# Patient Record
Sex: FEMALE | Race: OTHER | NOT HISPANIC OR LATINO
[De-identification: names, ages, dates, MRNs, and addresses within clinical notes are randomized per-mention and may not be internally consistent; named-entity substitution may affect disease eponyms.]

---

## 2024-05-06 PROBLEM — Z00.00 ENCOUNTER FOR PREVENTIVE HEALTH EXAMINATION: Status: ACTIVE | Noted: 2024-05-06

## 2024-05-07 ENCOUNTER — APPOINTMENT (OUTPATIENT)
Dept: MATERNAL FETAL MEDICINE | Facility: CLINIC | Age: 34
End: 2024-05-07
Payer: MEDICAID

## 2024-05-07 ENCOUNTER — APPOINTMENT (OUTPATIENT)
Dept: ANTEPARTUM | Facility: CLINIC | Age: 34
End: 2024-05-07
Payer: MEDICAID

## 2024-05-07 ENCOUNTER — ASOB RESULT (OUTPATIENT)
Age: 34
End: 2024-05-07

## 2024-05-07 DIAGNOSIS — O24.414 GESTATIONAL DIABETES MELLITUS IN PREGNANCY, INSULIN CONTROLLED: ICD-10-CM

## 2024-05-07 PROCEDURE — 76805 OB US >/= 14 WKS SNGL FETUS: CPT

## 2024-05-07 PROCEDURE — 76819 FETAL BIOPHYS PROFIL W/O NST: CPT

## 2024-05-07 PROCEDURE — G0108 DIAB MANAGE TRN  PER INDIV: CPT | Mod: 95

## 2024-05-07 RX ORDER — ISOPROPYL ALCOHOL 0.7 ML/ML
SWAB TOPICAL
Qty: 2 | Refills: 0 | Status: ACTIVE | COMMUNITY
Start: 2024-05-07 | End: 1900-01-01

## 2024-05-07 RX ORDER — INSULIN LISPRO 100 [IU]/ML
100 INJECTION, SOLUTION INTRAVENOUS; SUBCUTANEOUS
Qty: 2 | Refills: 0 | Status: ACTIVE | COMMUNITY
Start: 2024-05-07 | End: 1900-01-01

## 2024-05-07 RX ORDER — INSULIN GLARGINE 100 [IU]/ML
100 INJECTION, SOLUTION SUBCUTANEOUS
Qty: 1 | Refills: 1 | Status: ACTIVE | COMMUNITY
Start: 2024-05-07 | End: 1900-01-01

## 2024-05-07 RX ORDER — ELECTROLYTES/DEXTROSE
32G X 4 MM SOLUTION, ORAL ORAL
Qty: 2 | Refills: 2 | Status: ACTIVE | COMMUNITY
Start: 2024-05-07 | End: 1900-01-01

## 2024-05-14 ENCOUNTER — ASOB RESULT (OUTPATIENT)
Age: 34
End: 2024-05-14

## 2024-05-14 ENCOUNTER — APPOINTMENT (OUTPATIENT)
Dept: ANTEPARTUM | Facility: CLINIC | Age: 34
End: 2024-05-14
Payer: MEDICAID

## 2024-05-14 ENCOUNTER — APPOINTMENT (OUTPATIENT)
Dept: MATERNAL FETAL MEDICINE | Facility: CLINIC | Age: 34
End: 2024-05-14
Payer: MEDICAID

## 2024-05-14 PROCEDURE — 76818 FETAL BIOPHYS PROFILE W/NST: CPT

## 2024-05-14 PROCEDURE — G0108 DIAB MANAGE TRN  PER INDIV: CPT

## 2024-05-14 PROCEDURE — ZZZZZ: CPT

## 2024-05-19 ENCOUNTER — TRANSCRIPTION ENCOUNTER (OUTPATIENT)
Age: 34
End: 2024-05-19

## 2024-05-20 ENCOUNTER — INPATIENT (INPATIENT)
Facility: HOSPITAL | Age: 34
LOS: 2 days | Discharge: HOME | DRG: 951 | End: 2024-05-23
Attending: OBSTETRICS & GYNECOLOGY | Admitting: OBSTETRICS & GYNECOLOGY
Payer: MEDICAID

## 2024-05-20 VITALS
OXYGEN SATURATION: 99 % | HEART RATE: 88 BPM | RESPIRATION RATE: 16 BRPM | TEMPERATURE: 98 F | DIASTOLIC BLOOD PRESSURE: 58 MMHG | SYSTOLIC BLOOD PRESSURE: 112 MMHG

## 2024-05-20 DIAGNOSIS — Z98.891 HISTORY OF UTERINE SCAR FROM PREVIOUS SURGERY: Chronic | ICD-10-CM

## 2024-05-20 DIAGNOSIS — O24.419 GESTATIONAL DIABETES MELLITUS IN PREGNANCY, UNSPECIFIED CONTROL: ICD-10-CM

## 2024-05-20 DIAGNOSIS — O47.9 FALSE LABOR, UNSPECIFIED: ICD-10-CM

## 2024-05-20 DIAGNOSIS — Z34.80 ENCOUNTER FOR SUPERVISION OF OTHER NORMAL PREGNANCY, UNSPECIFIED TRIMESTER: ICD-10-CM

## 2024-05-20 DIAGNOSIS — O34.219 MATERNAL CARE FOR UNSPECIFIED TYPE SCAR FROM PREVIOUS CESAREAN DELIVERY: ICD-10-CM

## 2024-05-20 DIAGNOSIS — O26.899 OTHER SPECIFIED PREGNANCY RELATED CONDITIONS, UNSPECIFIED TRIMESTER: ICD-10-CM

## 2024-05-20 LAB
ABO RH CONFIRMATION: SIGNIFICANT CHANGE UP
AMNISURE ROM (RUPTURE OF MEMBRANES): NEGATIVE — SIGNIFICANT CHANGE UP
APTT BLD: 27.9 SEC — SIGNIFICANT CHANGE UP (ref 24.5–35.6)
HCT VFR BLD CALC: 33.6 % — LOW (ref 34.5–45)
HGB BLD-MCNC: 10.5 G/DL — LOW (ref 11.5–15.5)
INR BLD: 1 RATIO — SIGNIFICANT CHANGE UP (ref 0.85–1.18)
MCHC RBC-ENTMCNC: 22 PG — LOW (ref 27–34)
MCHC RBC-ENTMCNC: 31.3 GM/DL — LOW (ref 32–36)
MCV RBC AUTO: 70.4 FL — LOW (ref 80–100)
NRBC # BLD: 0 /100 WBCS — SIGNIFICANT CHANGE UP (ref 0–0)
PLATELET # BLD AUTO: 259 K/UL — SIGNIFICANT CHANGE UP (ref 150–400)
PROTHROM AB SERPL-ACNC: 11.4 SEC — SIGNIFICANT CHANGE UP (ref 9.5–13)
RBC # BLD: 4.77 M/UL — SIGNIFICANT CHANGE UP (ref 3.8–5.2)
RBC # FLD: 14.5 % — SIGNIFICANT CHANGE UP (ref 10.3–14.5)
WBC # BLD: 10.46 K/UL — SIGNIFICANT CHANGE UP (ref 3.8–10.5)
WBC # FLD AUTO: 10.46 K/UL — SIGNIFICANT CHANGE UP (ref 3.8–10.5)

## 2024-05-20 PROCEDURE — 88305 TISSUE EXAM BY PATHOLOGIST: CPT | Mod: 26

## 2024-05-20 DEVICE — AVITENE FLOUR SIX 0.5GR: Type: IMPLANTABLE DEVICE | Status: FUNCTIONAL

## 2024-05-20 DEVICE — SURGICEL FIBRILLAR 2 X 4": Type: IMPLANTABLE DEVICE | Status: FUNCTIONAL

## 2024-05-20 RX ORDER — MORPHINE SULFATE 50 MG/1
0.3 CAPSULE, EXTENDED RELEASE ORAL ONCE
Refills: 0 | Status: DISCONTINUED | OUTPATIENT
Start: 2024-05-20 | End: 2024-05-20

## 2024-05-20 RX ORDER — DEXTROSE 10 % IN WATER 10 %
125 INTRAVENOUS SOLUTION INTRAVENOUS ONCE
Refills: 0 | Status: DISCONTINUED | OUTPATIENT
Start: 2024-05-20 | End: 2024-05-23

## 2024-05-20 RX ORDER — CEFAZOLIN SODIUM 1 G
2000 VIAL (EA) INJECTION ONCE
Refills: 0 | Status: COMPLETED | OUTPATIENT
Start: 2024-05-20 | End: 2024-05-20

## 2024-05-20 RX ORDER — DIPHENHYDRAMINE HCL 50 MG
25 CAPSULE ORAL EVERY 6 HOURS
Refills: 0 | Status: DISCONTINUED | OUTPATIENT
Start: 2024-05-20 | End: 2024-05-23

## 2024-05-20 RX ORDER — SODIUM CHLORIDE 9 MG/ML
1000 INJECTION, SOLUTION INTRAVENOUS
Refills: 0 | Status: DISCONTINUED | OUTPATIENT
Start: 2024-05-20 | End: 2024-05-20

## 2024-05-20 RX ORDER — MAGNESIUM HYDROXIDE 400 MG/1
30 TABLET, CHEWABLE ORAL
Refills: 0 | Status: DISCONTINUED | OUTPATIENT
Start: 2024-05-20 | End: 2024-05-23

## 2024-05-20 RX ORDER — OXYTOCIN 10 UNIT/ML
333.33 VIAL (ML) INJECTION
Qty: 20 | Refills: 0 | Status: COMPLETED | OUTPATIENT
Start: 2024-05-20 | End: 2024-05-20

## 2024-05-20 RX ORDER — FAMOTIDINE 10 MG/ML
20 INJECTION INTRAVENOUS ONCE
Refills: 0 | Status: COMPLETED | OUTPATIENT
Start: 2024-05-20 | End: 2024-05-20

## 2024-05-20 RX ORDER — OXYCODONE HYDROCHLORIDE 5 MG/1
5 TABLET ORAL
Refills: 0 | Status: DISCONTINUED | OUTPATIENT
Start: 2024-05-20 | End: 2024-05-23

## 2024-05-20 RX ORDER — SIMETHICONE 80 MG/1
80 TABLET, CHEWABLE ORAL EVERY 4 HOURS
Refills: 0 | Status: DISCONTINUED | OUTPATIENT
Start: 2024-05-20 | End: 2024-05-23

## 2024-05-20 RX ORDER — TETANUS TOXOID, REDUCED DIPHTHERIA TOXOID AND ACELLULAR PERTUSSIS VACCINE, ADSORBED 5; 2.5; 8; 8; 2.5 [IU]/.5ML; [IU]/.5ML; UG/.5ML; UG/.5ML; UG/.5ML
0.5 SUSPENSION INTRAMUSCULAR ONCE
Refills: 0 | Status: DISCONTINUED | OUTPATIENT
Start: 2024-05-20 | End: 2024-05-23

## 2024-05-20 RX ORDER — LANOLIN
1 OINTMENT (GRAM) TOPICAL EVERY 6 HOURS
Refills: 0 | Status: DISCONTINUED | OUTPATIENT
Start: 2024-05-20 | End: 2024-05-23

## 2024-05-20 RX ORDER — KETOROLAC TROMETHAMINE 30 MG/ML
30 SYRINGE (ML) INJECTION EVERY 6 HOURS
Refills: 0 | Status: DISCONTINUED | OUTPATIENT
Start: 2024-05-20 | End: 2024-05-21

## 2024-05-20 RX ORDER — ACETAMINOPHEN 500 MG
975 TABLET ORAL
Refills: 0 | Status: DISCONTINUED | OUTPATIENT
Start: 2024-05-20 | End: 2024-05-23

## 2024-05-20 RX ORDER — CHLORHEXIDINE GLUCONATE 213 G/1000ML
1 SOLUTION TOPICAL DAILY
Refills: 0 | Status: DISCONTINUED | OUTPATIENT
Start: 2024-05-20 | End: 2024-05-20

## 2024-05-20 RX ORDER — HEPARIN SODIUM 5000 [USP'U]/ML
5000 INJECTION INTRAVENOUS; SUBCUTANEOUS EVERY 12 HOURS
Refills: 0 | Status: DISCONTINUED | OUTPATIENT
Start: 2024-05-21 | End: 2024-05-23

## 2024-05-20 RX ORDER — SODIUM CHLORIDE 9 MG/ML
1000 INJECTION, SOLUTION INTRAVENOUS
Refills: 0 | Status: DISCONTINUED | OUTPATIENT
Start: 2024-05-20 | End: 2024-05-23

## 2024-05-20 RX ORDER — CITRIC ACID/SODIUM CITRATE 300-500 MG
30 SOLUTION, ORAL ORAL ONCE
Refills: 0 | Status: COMPLETED | OUTPATIENT
Start: 2024-05-20 | End: 2024-05-20

## 2024-05-20 RX ORDER — GLUCAGON INJECTION, SOLUTION 0.5 MG/.1ML
1 INJECTION, SOLUTION SUBCUTANEOUS ONCE
Refills: 0 | Status: DISCONTINUED | OUTPATIENT
Start: 2024-05-20 | End: 2024-05-23

## 2024-05-20 RX ORDER — DEXTROSE 50 % IN WATER 50 %
12.5 SYRINGE (ML) INTRAVENOUS ONCE
Refills: 0 | Status: DISCONTINUED | OUTPATIENT
Start: 2024-05-20 | End: 2024-05-23

## 2024-05-20 RX ORDER — OXYCODONE HYDROCHLORIDE 5 MG/1
5 TABLET ORAL ONCE
Refills: 0 | Status: DISCONTINUED | OUTPATIENT
Start: 2024-05-20 | End: 2024-05-23

## 2024-05-20 RX ORDER — DEXTROSE 50 % IN WATER 50 %
25 SYRINGE (ML) INTRAVENOUS ONCE
Refills: 0 | Status: DISCONTINUED | OUTPATIENT
Start: 2024-05-20 | End: 2024-05-23

## 2024-05-20 RX ORDER — IBUPROFEN 200 MG
600 TABLET ORAL EVERY 6 HOURS
Refills: 0 | Status: COMPLETED | OUTPATIENT
Start: 2024-05-20 | End: 2025-04-18

## 2024-05-20 RX ORDER — INSULIN LISPRO 100/ML
VIAL (ML) SUBCUTANEOUS
Refills: 0 | Status: DISCONTINUED | OUTPATIENT
Start: 2024-05-20 | End: 2024-05-22

## 2024-05-20 RX ORDER — SODIUM CHLORIDE 9 MG/ML
1000 INJECTION, SOLUTION INTRAVENOUS
Refills: 0 | Status: DISCONTINUED | OUTPATIENT
Start: 2024-05-20 | End: 2024-05-22

## 2024-05-20 RX ORDER — DEXTROSE 50 % IN WATER 50 %
15 SYRINGE (ML) INTRAVENOUS ONCE
Refills: 0 | Status: DISCONTINUED | OUTPATIENT
Start: 2024-05-20 | End: 2024-05-23

## 2024-05-20 RX ADMIN — FAMOTIDINE 20 MILLIGRAM(S): 10 INJECTION INTRAVENOUS at 11:09

## 2024-05-20 RX ADMIN — Medication 30 MILLIGRAM(S): at 23:43

## 2024-05-20 RX ADMIN — Medication 975 MILLIGRAM(S): at 21:45

## 2024-05-20 RX ADMIN — Medication 30 MILLIGRAM(S): at 18:00

## 2024-05-20 RX ADMIN — Medication 100 MILLIGRAM(S): at 12:30

## 2024-05-20 RX ADMIN — Medication 30 MILLIGRAM(S): at 19:00

## 2024-05-20 RX ADMIN — Medication 975 MILLIGRAM(S): at 20:45

## 2024-05-20 RX ADMIN — Medication 1000 MILLIUNIT(S)/MIN: at 17:16

## 2024-05-20 RX ADMIN — SODIUM CHLORIDE 125 MILLILITER(S): 9 INJECTION, SOLUTION INTRAVENOUS at 11:37

## 2024-05-20 RX ADMIN — Medication 30 MILLILITER(S): at 11:09

## 2024-05-20 NOTE — OB RN TRIAGE NOTE - FALL HARM RISK - UNIVERSAL INTERVENTIONS
Bed in lowest position, wheels locked, appropriate side rails in place/Call bell, personal items and telephone in reach/Instruct patient to call for assistance before getting out of bed or chair/Non-slip footwear when patient is out of bed/[place] to call system/Physically safe environment - no spills, clutter or unnecessary equipment/Purposeful Proactive Rounding/Room/bathroom lighting operational, light cord in reach

## 2024-05-20 NOTE — OB RN PATIENT PROFILE - NSSDOHPUBLICBEN_OBGYN_A_OB
Called pt to come in to appt today since pft machine is up and running. No answer. LVM.    I do not need help with these

## 2024-05-20 NOTE — OB NEONATOLOGY/PEDIATRICIAN DELIVERY SUMMARY - NSPEDSNEONOTESA_OBGYN_ALL_OB_FT
attended c/s delivery. indication repeat c/s in labor all prenatal labs wnl. GBS neg. blood gp. A positive.. Hepatitis C is unknown. amniotic fluid clear . baby girl  born by cephalic presentation cried soon after birth. cord clamped at 30 secs.  3 umbellical vessels present. physical exam normal. no anomalies.hemodynamically stable.  apgars 9/9. plan [place] care, breast feeding attended c/s delivery. indication repeat c/s in labor all prenatal labs wnl. GBS neg. blood gp. A positive.. Hepatitis C is unknown. amniotic fluid clear .gestational diabetic on Insulin. baby girl  born by cephalic presentation cried soon after birth. cord clamped at 30 secs.  3 umbellical vessels present. physical exam normal. no anomalies.hemodynamically stable.  apgars 9/9. plan [place] care, breast feeding

## 2024-05-20 NOTE — OB RN TRIAGE NOTE - NS_DATEOFLASTVISIT_OBGYN_ALL_OB_DT
There are no preventive care reminders to display for this patient.  Chart review done. HM updated. Immunizations reviewed & updated. Care Everywhere updated.  Confirmed that Mammogram from 11/29/22 transferred to chart via Care Everywhere.    
16-May-2024

## 2024-05-20 NOTE — OB PROVIDER H&P - PROBLEM SELECTOR PLAN 2
34 yo  at 37w5d with h/o prior c/s, GDMA2 ruled out for ROM, now with uterine CTX for repeat c/s, NST reactive, AFVSS  - admit to L&D  - fetal monitoring  - NPO/IVF  - CBC  - RPR  - coags/fibrinogen  - anncef/bicitra pre op  - SCDs/keenan in OR  - consent for repeat c/s and BTL to be obtained by Dr. Marie

## 2024-05-20 NOTE — OB RN PATIENT PROFILE - NS_OBGYNHISTORY_OBGYN_ALL_OB_FT
POBHx:  2020 FT c/s 9# for LGA, never labored, uncomplicated    PGYNHx:  no abnl PAP, no C/F, no STI    GDAM2  15 u at night Levinor  4u before meals Humalog

## 2024-05-20 NOTE — OB PROVIDER DELIVERY SUMMARY - NS_ROMTYPE_OBGYN_ALL_OB
Can order Vit D, 25, CBCD, CMP, TSH, Free T4, Iron/IBC based on if parent wants to come into the office or go to an ACL lab.    AROM

## 2024-05-20 NOTE — OB PROVIDER H&P - PROBLEM SELECTOR PLAN 1
32 yo  at 37w5d with h/o prior c/s, GDMA2 ruled out for ROM, now with uterine CTX for repeat c/s, NST reactive, AFVSS  - type and cross u

## 2024-05-20 NOTE — OB RN PATIENT PROFILE - FALL HARM RISK - UNIVERSAL INTERVENTIONS
Bed in lowest position, wheels locked, appropriate side rails in place/Call bell, personal items and telephone in reach/Instruct patient to call for assistance before getting out of bed or chair/Non-slip footwear when patient is out of bed/[place] to call system/Physically safe environment - no spills, clutter or unnecessary equipment/Purposeful Proactive Rounding/Room/bathroom lighting operational, light cord in reach

## 2024-05-20 NOTE — OB PROVIDER H&P - HISTORY OF PRESENT ILLNESS
32 yo  at 37w5d EDC 24, LMP 23 h/o prior c/s presents with irregular CTX and "pop" at 540a with gush of yellow fluid. no further LOF. No VB. +FM.    PNC With Dr. Marie c/b GDMA2 on humalog 4u AC/HS and levemir 15 u qhs. Fasting 80-90s, PP 90-140s    POBHx:  2020 FT c/s 9# for LGA, never labored, uncomplicatf    PGYNHx:  no abnl PAP, no C/F, no STI    PMHx:  denies    PSHx:  c/s     Meds: levemir, humalog, PNV    All: shellfish-> swelling    SHx: no T/E/D    FHx: denies    ROS: asa dexter

## 2024-05-20 NOTE — OB PROVIDER H&P - ASSESSMENT
34 yo  at 37w5d with h/o prior c/s, GDMA2 ruled out for ROM, now with uterine CTX for repeat c/s, NST reactive, AFVSS  - admit to L&D  - fetal monitoring  - NPO/IVF  - CBC/type and cross u  - RPR  - coags/fibrinogen  - FSG 99  - anncef/bicitra pre op  - SCDs/keenan in OR  - consent for repeat c/s and BTL to be obtained by Dr. Marie

## 2024-05-20 NOTE — OB PROVIDER H&P - NSHPPHYSICALEXAM_GEN_ALL_CORE
Vital Signs Last 24 Hrs  T(C): 36.6 (20 May 2024 09:01), Max: 36.6 (20 May 2024 09:01)  T(F): 97.9 (20 May 2024 09:01), Max: 97.9 (20 May 2024 09:01)  HR: 88 (20 May 2024 09:01) (88 - 88)  BP: 112/58 (20 May 2024 09:01) (112/58 - 112/58)  BP(mean): --  RR: 16 (20 May 2024 09:01) (16 - 16)  SpO2: 99% (20 May 2024 09:01) (99% - 99%)    Parameters below as of 20 May 2024 09:01  Patient On (Oxygen Delivery Method): room air    Gen: NAD  Abd: soft, NT, gravid  Ext: nontender bilat    VE: 0/0/-5  Amnisure neg    EFW: 4000g    BSUS: vertex, anterior placenta

## 2024-05-20 NOTE — OB PROVIDER DELIVERY SUMMARY - NSSELHIDDEN_OBGYN_ALL_OB_FT
[NS_DeliveryAttending1_OBGYN_ALL_OB_FT:[id number]==],[NS_DeliveryAttending2_OBGYN_ALL_OB_FT:[id number]==]

## 2024-05-20 NOTE — OB RN INTRAOPERATIVE NOTE - NSSELHIDDEN_OBGYN_ALL_OB_FT
[NS_DeliveryAttending1_OBGYN_ALL_OB_FT:[id number]==],[NS_DeliveryAttending2_OBGYN_ALL_OB_FT:[id number]==]

## 2024-05-20 NOTE — OB RN DELIVERY SUMMARY - NSSELHIDDEN_OBGYN_ALL_OB_FT
[NS_DeliveryAttending1_OBGYN_ALL_OB_FT:[id number]==],[NS_DeliveryAttending2_OBGYN_ALL_OB_FT:[id number]==] [NS_DeliveryAttending1_OBGYN_ALL_OB_FT:[id number]==],[NS_DeliveryAttending2_OBGYN_ALL_OB_FT:[id number]==],[NS_DeliveryAssist1_OBGYN_ALL_OB_FT:[id number]=]

## 2024-05-21 ENCOUNTER — TRANSCRIPTION ENCOUNTER (OUTPATIENT)
Age: 34
End: 2024-05-21

## 2024-05-21 LAB
A1C WITH ESTIMATED AVERAGE GLUCOSE RESULT: 4.6 % — SIGNIFICANT CHANGE UP (ref 4–5.6)
BASOPHILS # BLD AUTO: 0.02 K/UL — SIGNIFICANT CHANGE UP (ref 0–0.2)
BASOPHILS NFR BLD AUTO: 0.2 % — SIGNIFICANT CHANGE UP (ref 0–2)
EOSINOPHIL # BLD AUTO: 0.06 K/UL — SIGNIFICANT CHANGE UP (ref 0–0.5)
EOSINOPHIL NFR BLD AUTO: 0.7 % — SIGNIFICANT CHANGE UP (ref 0–6)
ESTIMATED AVERAGE GLUCOSE: 85 MG/DL — SIGNIFICANT CHANGE UP (ref 68–114)
HCT VFR BLD CALC: 26.3 % — LOW (ref 34.5–45)
HCV AB S/CO SERPL IA: 0.08 S/CO — SIGNIFICANT CHANGE UP (ref 0–0.99)
HCV AB SERPL-IMP: SIGNIFICANT CHANGE UP
HGB BLD-MCNC: 8.4 G/DL — LOW (ref 11.5–15.5)
IMM GRANULOCYTES NFR BLD AUTO: 0.3 % — SIGNIFICANT CHANGE UP (ref 0–0.9)
LYMPHOCYTES # BLD AUTO: 2.03 K/UL — SIGNIFICANT CHANGE UP (ref 1–3.3)
LYMPHOCYTES # BLD AUTO: 22.2 % — SIGNIFICANT CHANGE UP (ref 13–44)
MCHC RBC-ENTMCNC: 21.9 PG — LOW (ref 27–34)
MCHC RBC-ENTMCNC: 31.9 GM/DL — LOW (ref 32–36)
MCV RBC AUTO: 68.5 FL — LOW (ref 80–100)
MONOCYTES # BLD AUTO: 0.39 K/UL — SIGNIFICANT CHANGE UP (ref 0–0.9)
MONOCYTES NFR BLD AUTO: 4.3 % — SIGNIFICANT CHANGE UP (ref 2–14)
NEUTROPHILS # BLD AUTO: 6.63 K/UL — SIGNIFICANT CHANGE UP (ref 1.8–7.4)
NEUTROPHILS NFR BLD AUTO: 72.3 % — SIGNIFICANT CHANGE UP (ref 43–77)
NRBC # BLD: 0 /100 WBCS — SIGNIFICANT CHANGE UP (ref 0–0)
PLATELET # BLD AUTO: 215 K/UL — SIGNIFICANT CHANGE UP (ref 150–400)
RBC # BLD: 3.84 M/UL — SIGNIFICANT CHANGE UP (ref 3.8–5.2)
RBC # FLD: 14.8 % — HIGH (ref 10.3–14.5)
T PALLIDUM AB TITR SER: NEGATIVE — SIGNIFICANT CHANGE UP
WBC # BLD: 9.16 K/UL — SIGNIFICANT CHANGE UP (ref 3.8–10.5)
WBC # FLD AUTO: 9.16 K/UL — SIGNIFICANT CHANGE UP (ref 3.8–10.5)

## 2024-05-21 RX ORDER — SIMETHICONE 80 MG/1
1 TABLET, CHEWABLE ORAL
Qty: 30 | Refills: 0
Start: 2024-05-21 | End: 2024-05-25

## 2024-05-21 RX ORDER — FERROUS SULFATE 325(65) MG
1 TABLET ORAL
Qty: 30 | Refills: 0
Start: 2024-05-21 | End: 2024-06-19

## 2024-05-21 RX ORDER — IBUPROFEN 200 MG
1 TABLET ORAL
Qty: 20 | Refills: 0
Start: 2024-05-21 | End: 2024-05-25

## 2024-05-21 RX ORDER — IBUPROFEN 200 MG
600 TABLET ORAL EVERY 6 HOURS
Refills: 0 | Status: DISCONTINUED | OUTPATIENT
Start: 2024-05-21 | End: 2024-05-23

## 2024-05-21 RX ORDER — ASCORBIC ACID 60 MG
1 TABLET,CHEWABLE ORAL
Qty: 30 | Refills: 0
Start: 2024-05-21 | End: 2024-06-19

## 2024-05-21 RX ORDER — FAMOTIDINE 10 MG/ML
20 INJECTION INTRAVENOUS
Refills: 0 | Status: DISCONTINUED | OUTPATIENT
Start: 2024-05-21 | End: 2024-05-23

## 2024-05-21 RX ORDER — SENNOSIDES/DOCUSATE SODIUM 8.6MG-50MG
1 TABLET ORAL
Qty: 60 | Refills: 0
Start: 2024-05-21 | End: 2024-06-19

## 2024-05-21 RX ORDER — ACETAMINOPHEN 500 MG
2 TABLET ORAL
Qty: 40 | Refills: 1
Start: 2024-05-21 | End: 2024-05-30

## 2024-05-21 RX ORDER — FAMOTIDINE 10 MG/ML
1 INJECTION INTRAVENOUS
Qty: 20 | Refills: 0
Start: 2024-05-21 | End: 2024-05-30

## 2024-05-21 RX ORDER — SENNA PLUS 8.6 MG/1
2 TABLET ORAL
Qty: 20 | Refills: 0
Start: 2024-05-21 | End: 2024-05-30

## 2024-05-21 RX ADMIN — Medication 975 MILLIGRAM(S): at 21:25

## 2024-05-21 RX ADMIN — Medication 975 MILLIGRAM(S): at 22:30

## 2024-05-21 RX ADMIN — Medication 30 MILLIGRAM(S): at 00:45

## 2024-05-21 RX ADMIN — Medication 975 MILLIGRAM(S): at 08:48

## 2024-05-21 RX ADMIN — Medication 30 MILLIGRAM(S): at 07:47

## 2024-05-21 RX ADMIN — Medication 975 MILLIGRAM(S): at 04:50

## 2024-05-21 RX ADMIN — Medication 600 MILLIGRAM(S): at 18:54

## 2024-05-21 RX ADMIN — Medication 30 MILLIGRAM(S): at 11:43

## 2024-05-21 RX ADMIN — Medication 975 MILLIGRAM(S): at 03:50

## 2024-05-21 RX ADMIN — Medication 600 MILLIGRAM(S): at 18:23

## 2024-05-21 RX ADMIN — FAMOTIDINE 20 MILLIGRAM(S): 10 INJECTION INTRAVENOUS at 18:23

## 2024-05-21 RX ADMIN — SIMETHICONE 80 MILLIGRAM(S): 80 TABLET, CHEWABLE ORAL at 08:48

## 2024-05-21 RX ADMIN — Medication 975 MILLIGRAM(S): at 09:48

## 2024-05-21 RX ADMIN — Medication 600 MILLIGRAM(S): at 22:30

## 2024-05-21 RX ADMIN — Medication 600 MILLIGRAM(S): at 23:53

## 2024-05-21 RX ADMIN — Medication 975 MILLIGRAM(S): at 15:27

## 2024-05-21 RX ADMIN — HEPARIN SODIUM 5000 UNIT(S): 5000 INJECTION INTRAVENOUS; SUBCUTANEOUS at 06:35

## 2024-05-21 RX ADMIN — Medication 975 MILLIGRAM(S): at 16:30

## 2024-05-21 RX ADMIN — HEPARIN SODIUM 5000 UNIT(S): 5000 INJECTION INTRAVENOUS; SUBCUTANEOUS at 18:23

## 2024-05-21 RX ADMIN — Medication 30 MILLIGRAM(S): at 11:56

## 2024-05-21 RX ADMIN — Medication 30 MILLIGRAM(S): at 06:33

## 2024-05-21 NOTE — DISCHARGE NOTE OB - CARE PROVIDER_API CALL
Rio Pitts  Obstetrics and Gynecology  [address] [place] [address]  Phone: [phone]  Fax: [phone]  Established Patient  Follow Up Time: 1 week

## 2024-05-21 NOTE — DISCHARGE NOTE OB - PLAN OF CARE
continue the diet recommendations of the diabetic counselor    3hour glucose tolerance test 6weeks after the delivery supplement with prenatal vitamins in the morning    supplement with iron vitamins at night no sex nothing in vagina no heavy lifting no pushing no straining no strenuous activities  pain medication as needed; stool softener; dulcolax as needed if constipated  walk for exercise: helps recovery   continue prenatal vitamins daily especially whole course of breastfeeding  see your OB in the office for follow up post partum check call the office set up appointment in 1-2weeks  clean wound daily with soap and water; please note wound for redness or swelling; if noted go to the office right away so the doctor can evaluate the wound for possible wound infection

## 2024-05-21 NOTE — DISCHARGE NOTE OB - CARE PLAN
1 Principal Discharge DX:	 delivery delivered  Assessment and plan of treatment:	no sex nothing in vagina no heavy lifting no pushing no straining no strenuous activities  pain medication as needed; stool softener; dulcolax as needed if constipated  walk for exercise: helps recovery   continue prenatal vitamins daily especially whole course of breastfeeding  see your OB in the office for follow up post partum check call the office set up appointment in 1-2weeks  clean wound daily with soap and water; please note wound for redness or swelling; if noted go to the office right away so the doctor can evaluate the wound for possible wound infection  Secondary Diagnosis:	GDM, class A2  Assessment and plan of treatment:	continue the diet recommendations of the diabetic counselor    3hour glucose tolerance test 6weeks after the delivery  Secondary Diagnosis:	Postoperative anemia due to acute blood loss  Assessment and plan of treatment:	supplement with prenatal vitamins in the morning    supplement with iron vitamins at night

## 2024-05-21 NOTE — DISCHARGE NOTE OB - MEDICATION SUMMARY - MEDICATIONS TO TAKE
I will START or STAY ON the medications listed below when I get home from the hospital:    ibuprofen 600 mg oral tablet  -- 1 tab(s) by mouth every 6 hours as needed for , Mild pain or headache  -- Indication: For for pain    acetaminophen 500 mg oral capsule  -- 2 cap(s) by mouth every 6 hours as needed for -  -- Indication: For for pain    famotidine 20 mg oral tablet  -- 1 tab(s) by mouth 2 times a day  -- Indication: For Prevents gastritis    PreNatal Low Iron oral tablet  -- 1 tab(s) by mouth once a day  -- Indication: For Supplement    ferrous sulfate (as elemental iron) 45 mg oral tablet, extended release  -- 1 tab(s) by mouth once a day  -- Indication: For Postoperative anemia due to acute blood loss    senna leaf extract oral tablet  -- 2 tab(s) by mouth once a day  -- Indication: For bowel regimen    Colace 2-in-1 50 mg-8.6 mg oral tablet  -- 1 tab(s) by mouth 2 times a day  -- Indication: For bowel regimen    simethicone 80 mg oral tablet, chewable  -- 1 tab(s) by mouth every 4 hours as needed for , Gas  -- Indication: For Helps pass gas    ascorbic acid 500 mg oral tablet  -- 1 tab(s) by mouth once a day  -- Indication: For Helps absorb iron

## 2024-05-21 NOTE — DISCHARGE NOTE OB - NS MD DC FALL RISK RISK
For information on Fall & Injury Prevention, visit: [email or web address] OR  [email or web address] OR  https://www.cdc.gov/steadi/patient.html

## 2024-05-21 NOTE — DISCHARGE NOTE OB - PATIENT PORTAL LINK FT
You can access the FollowMyHealth Patient Portal offered by [facility] by registering at the following website: [email or web address]. By joining [employer]’s FollowMyHealth portal, you will also be able to view your health information using other applications (apps) compatible with our system.

## 2024-05-22 ENCOUNTER — APPOINTMENT (OUTPATIENT)
Dept: ANTEPARTUM | Facility: CLINIC | Age: 34
End: 2024-05-22

## 2024-05-22 DIAGNOSIS — D25.9 LEIOMYOMA OF UTERUS, UNSPECIFIED: ICD-10-CM

## 2024-05-22 DIAGNOSIS — D62 ACUTE POSTHEMORRHAGIC ANEMIA: ICD-10-CM

## 2024-05-22 LAB
BASOPHILS # BLD AUTO: 0.02 K/UL — SIGNIFICANT CHANGE UP (ref 0–0.2)
BASOPHILS NFR BLD AUTO: 0.2 % — SIGNIFICANT CHANGE UP (ref 0–2)
EOSINOPHIL # BLD AUTO: 0.11 K/UL — SIGNIFICANT CHANGE UP (ref 0–0.5)
EOSINOPHIL NFR BLD AUTO: 1.3 % — SIGNIFICANT CHANGE UP (ref 0–6)
HCT VFR BLD CALC: 26.2 % — LOW (ref 34.5–45)
HGB BLD-MCNC: 8.1 G/DL — LOW (ref 11.5–15.5)
IMM GRANULOCYTES NFR BLD AUTO: 0.3 % — SIGNIFICANT CHANGE UP (ref 0–0.9)
LYMPHOCYTES # BLD AUTO: 2.35 K/UL — SIGNIFICANT CHANGE UP (ref 1–3.3)
LYMPHOCYTES # BLD AUTO: 26.7 % — SIGNIFICANT CHANGE UP (ref 13–44)
MCHC RBC-ENTMCNC: 21.9 PG — LOW (ref 27–34)
MCHC RBC-ENTMCNC: 30.9 GM/DL — LOW (ref 32–36)
MCV RBC AUTO: 70.8 FL — LOW (ref 80–100)
MONOCYTES # BLD AUTO: 0.45 K/UL — SIGNIFICANT CHANGE UP (ref 0–0.9)
MONOCYTES NFR BLD AUTO: 5.1 % — SIGNIFICANT CHANGE UP (ref 2–14)
NEUTROPHILS # BLD AUTO: 5.84 K/UL — SIGNIFICANT CHANGE UP (ref 1.8–7.4)
NEUTROPHILS NFR BLD AUTO: 66.4 % — SIGNIFICANT CHANGE UP (ref 43–77)
NRBC # BLD: 0 /100 WBCS — SIGNIFICANT CHANGE UP (ref 0–0)
PLATELET # BLD AUTO: 232 K/UL — SIGNIFICANT CHANGE UP (ref 150–400)
RBC # BLD: 3.7 M/UL — LOW (ref 3.8–5.2)
RBC # FLD: 14.6 % — HIGH (ref 10.3–14.5)
WBC # BLD: 8.8 K/UL — SIGNIFICANT CHANGE UP (ref 3.8–10.5)
WBC # FLD AUTO: 8.8 K/UL — SIGNIFICANT CHANGE UP (ref 3.8–10.5)

## 2024-05-22 RX ORDER — SENNA PLUS 8.6 MG/1
2 TABLET ORAL DAILY
Refills: 0 | Status: DISCONTINUED | OUTPATIENT
Start: 2024-05-22 | End: 2024-05-23

## 2024-05-22 RX ORDER — OXYCODONE HYDROCHLORIDE 5 MG/1
5 TABLET ORAL ONCE
Refills: 0 | Status: DISCONTINUED | OUTPATIENT
Start: 2024-05-22 | End: 2024-05-23

## 2024-05-22 RX ORDER — INSULIN LISPRO 100/ML
VIAL (ML) SUBCUTANEOUS
Refills: 0 | Status: DISCONTINUED | OUTPATIENT
Start: 2024-05-22 | End: 2024-05-23

## 2024-05-22 RX ADMIN — MAGNESIUM HYDROXIDE 30 MILLILITER(S): 400 TABLET, CHEWABLE ORAL at 07:50

## 2024-05-22 RX ADMIN — Medication 975 MILLIGRAM(S): at 04:40

## 2024-05-22 RX ADMIN — SENNA PLUS 2 TABLET(S): 8.6 TABLET ORAL at 11:14

## 2024-05-22 RX ADMIN — HEPARIN SODIUM 5000 UNIT(S): 5000 INJECTION INTRAVENOUS; SUBCUTANEOUS at 07:09

## 2024-05-22 RX ADMIN — Medication 600 MILLIGRAM(S): at 17:20

## 2024-05-22 RX ADMIN — Medication 600 MILLIGRAM(S): at 11:13

## 2024-05-22 RX ADMIN — Medication 600 MILLIGRAM(S): at 18:05

## 2024-05-22 RX ADMIN — Medication 975 MILLIGRAM(S): at 03:36

## 2024-05-22 RX ADMIN — Medication 600 MILLIGRAM(S): at 12:10

## 2024-05-22 RX ADMIN — Medication 600 MILLIGRAM(S): at 06:30

## 2024-05-22 RX ADMIN — SIMETHICONE 80 MILLIGRAM(S): 80 TABLET, CHEWABLE ORAL at 07:50

## 2024-05-22 RX ADMIN — Medication 975 MILLIGRAM(S): at 19:00

## 2024-05-22 RX ADMIN — Medication 0.5 MILLILITER(S): at 06:18

## 2024-05-22 RX ADMIN — Medication 975 MILLIGRAM(S): at 18:44

## 2024-05-22 RX ADMIN — HEPARIN SODIUM 5000 UNIT(S): 5000 INJECTION INTRAVENOUS; SUBCUTANEOUS at 18:31

## 2024-05-22 RX ADMIN — FAMOTIDINE 20 MILLIGRAM(S): 10 INJECTION INTRAVENOUS at 17:20

## 2024-05-22 RX ADMIN — FAMOTIDINE 20 MILLIGRAM(S): 10 INJECTION INTRAVENOUS at 05:51

## 2024-05-22 RX ADMIN — Medication 600 MILLIGRAM(S): at 05:51

## 2024-05-22 NOTE — PROGRESS NOTE ADULT - ASSESSMENT
A/P: 32 yo POD #1 s/p c/s @ 37w5d, intraop myomectomy completed. pregnancy complicated by GDMA2,  pt stable    -Pain management as needed  -DVT ppx: OOB and ambulate, Heparin   -Labs reviewed  -Vitals reviewed  -f/u Rpt CBC in am   -Advance diet to regular  -Encourage breastfeeding   - FS AC/HS     Dr Pitts aware 
PA NOTE:  POD#2  s/p: rpt cs and myomectomy at 37 5/7weeks who came in labor  pt doing well offers no acute complaints                           8.1    8.80  )-----------( 232      ( 22 May 2024 06:35 )             26.2     -dw dr galdamez is baby is cleared pt requesting dc home today    - dc instructions verbalized: no sex nothing in vagina no heavy lifting no pushing no straining no strenuous activities  pain medication as needed; stool softener; dulcolax as needed if constipated  walk for exercise: helps recovery   continue prenatal vitamins daily especially whole course of breastfeeding  see your OB in the office for follow up post partum check call the office set up appointment in 1-2weeks  clean wound daily with soap and water; please note wound for redness or swelling; if noted go to the office right away so the doctor can evaluate the wound for possible wound infection      - wound check with dr galdamez 1week

## 2024-05-23 VITALS
RESPIRATION RATE: 16 BRPM | OXYGEN SATURATION: 99 % | HEART RATE: 98 BPM | TEMPERATURE: 98 F | SYSTOLIC BLOOD PRESSURE: 116 MMHG | DIASTOLIC BLOOD PRESSURE: 71 MMHG

## 2024-05-23 PROCEDURE — 86850 RBC ANTIBODY SCREEN: CPT

## 2024-05-23 PROCEDURE — 36415 COLL VENOUS BLD VENIPUNCTURE: CPT

## 2024-05-23 PROCEDURE — 85610 PROTHROMBIN TIME: CPT

## 2024-05-23 PROCEDURE — C1889: CPT

## 2024-05-23 PROCEDURE — 84112 EVAL AMNIOTIC FLUID PROTEIN: CPT

## 2024-05-23 PROCEDURE — 85027 COMPLETE CBC AUTOMATED: CPT

## 2024-05-23 PROCEDURE — 85025 COMPLETE CBC W/AUTO DIFF WBC: CPT

## 2024-05-23 PROCEDURE — 82962 GLUCOSE BLOOD TEST: CPT

## 2024-05-23 PROCEDURE — 59025 FETAL NON-STRESS TEST: CPT

## 2024-05-23 PROCEDURE — 86923 COMPATIBILITY TEST ELECTRIC: CPT

## 2024-05-23 PROCEDURE — 86900 BLOOD TYPING SEROLOGIC ABO: CPT

## 2024-05-23 PROCEDURE — 90707 MMR VACCINE SC: CPT

## 2024-05-23 PROCEDURE — 88305 TISSUE EXAM BY PATHOLOGIST: CPT

## 2024-05-23 PROCEDURE — 86803 HEPATITIS C AB TEST: CPT

## 2024-05-23 PROCEDURE — 85730 THROMBOPLASTIN TIME PARTIAL: CPT

## 2024-05-23 PROCEDURE — 86780 TREPONEMA PALLIDUM: CPT

## 2024-05-23 PROCEDURE — 59050 FETAL MONITOR W/REPORT: CPT

## 2024-05-23 PROCEDURE — 86901 BLOOD TYPING SEROLOGIC RH(D): CPT

## 2024-05-23 PROCEDURE — 83036 HEMOGLOBIN GLYCOSYLATED A1C: CPT

## 2024-05-23 RX ADMIN — HEPARIN SODIUM 5000 UNIT(S): 5000 INJECTION INTRAVENOUS; SUBCUTANEOUS at 06:27

## 2024-05-23 RX ADMIN — Medication 975 MILLIGRAM(S): at 09:23

## 2024-05-23 RX ADMIN — Medication 600 MILLIGRAM(S): at 00:27

## 2024-05-23 RX ADMIN — Medication 600 MILLIGRAM(S): at 06:12

## 2024-05-23 RX ADMIN — Medication 600 MILLIGRAM(S): at 01:00

## 2024-05-23 RX ADMIN — Medication 600 MILLIGRAM(S): at 06:52

## 2024-05-23 RX ADMIN — Medication 975 MILLIGRAM(S): at 10:25

## 2024-05-23 NOTE — PROGRESS NOTE ADULT - SUBJECTIVE AND OBJECTIVE BOX
POD#1  Pt seen at bedside.   Camilo sSOB, dyspnea, palpitations  Vital Signs Last 24 Hrs  T(C): 37 (21 May 2024 14:23), Max: 37.3 (21 May 2024 08:45)  T(F): 98.6 (21 May 2024 14:23), Max: 99.1 (21 May 2024 08:45)  HR: 91 (21 May 2024 14:23) (73 - 98)  BP: 108/62 (21 May 2024 14:23) (99/64 - 127/79)  BP(mean): --  RR: 18 (21 May 2024 14:23) (17 - 18)  SpO2: 96% (21 May 2024 14:23) (96% - 97%)    Parameters below as of 21 May 2024 14:23  Patient On (Oxygen Delivery Method): room air    HR: S1S2 RRR  Lungs: CTa B/L  Abdomen soft, incision clean dry  Ext neg fely's                        8.4    9.16  )-----------( 215      ( 21 May 2024 06:00 )             26.3   A/P: POD#1 S/ Repeat c/section, GDMA 2 , acute blood loss anemia, stable  Routine post op care  Continue current management
POD#3  Pt seen at bedside feels well no c/o  vital stable                            8.1    8.80  )-----------( 232      ( 22 May 2024 06:35 )             26.2   A/P POD# 3 S/P repeat c/section and fibroid removal, acute blood loss anemia  D/C home today
Patient seen at bedside resting comfortably offers no new complaints. + ambulating, + void spontaneously yet.  + flatus; no bowel movement; tolerating clear liquid diet.  Pt both breast and bottle feeding. Pt denies headache, weakness, chest pain, shortness of breath, blurry vision or epigastric pain, N/V/D,  palpitations, worsening vaginal bleeding.    Vital Signs Last 24 Hrs  T(C): 37.3 (21 May 2024 08:45), Max: 37.3 (21 May 2024 08:45)  T(F): 99.1 (21 May 2024 08:45), Max: 99.1 (21 May 2024 08:45)  HR: 90 (21 May 2024 08:45) (73 - 99)  BP: 109/73 (21 May 2024 08:45) (96/57 - 144/86)  BP(mean): 91 (20 May 2024 16:30) (70 - 91)  RR: 18 (21 May 2024 08:45) (16 - 18)  SpO2: 96% (21 May 2024 08:45) (96% - 100%)    Parameters below as of 21 May 2024 08:45  Patient On (Oxygen Delivery Method): room air        Gen: A&O x 3, NAD  Breast: Soft, nontender, nonengorged, no erythema  Abdomen: +BS; soft; Nontender, nondistended; dressing removed incision C/D/I, no erythema/tenderneess/edema, steri strips in place, fundus is firm  Gyn: minimal lochia   Extremities: Nontender, venodynes in place, no calf tenderness,                        8.4    9.16  )-----------( 215      ( 21 May 2024 06:00 )             26.3       
PA NOTE:  POD#2  s/p: rpt cs and myomectomy at 37 5/7weeks who came in labor  pt doing well offers no acute complaints     Patient seen at bedisde resting comfortably offers no new complaints. + Ambulation, + void without difficulty, + flatus; tolerating regular diet. both breastfeeding and bottle feeding. Denies HA, CP, SOB, N/V/D,  no bm; dizziness, palpitations, worsening abdominal pain, worsening vaginal bleeding, or any other concerns.     Vital Signs Last 24 Hrs  T(C): 36.6 (22 May 2024 06:33), Max: 37 (21 May 2024 14:23)  T(F): 97.9 (22 May 2024 06:33), Max: 98.6 (21 May 2024 14:23)  HR: 67 (22 May 2024 06:33) (67 - 96)  BP: 111/71 (22 May 2024 06:33) (108/62 - 113/74)  BP(mean): --  RR: 16 (22 May 2024 06:33) (16 - 18)  SpO2: 97% (22 May 2024 06:33) (96% - 97%)    Parameters below as of 22 May 2024 06:33  Patient On (Oxygen Delivery Method): room air      CAPILLARY BLOOD GLUCOSE      POCT Blood Glucose.: 94 mg/dL (22 May 2024 07:42)  POCT Blood Glucose.: 128 mg/dL (21 May 2024 22:10)  POCT Blood Glucose.: 108 mg/dL (21 May 2024 16:02)  POCT Blood Glucose.: 95 mg/dL (21 May 2024 11:22)      Gen: A&O x 3, NAD  Chest: CTABL  Cardiac: S1+S2+ RRR  Breast: Soft, nontender, nonengorged  Abdomen: +BS; soft; Nontender, nondistended,   incision site: C/D/I steri strips in place   Gyn: Minimal lochia  Extremities: Nontender, DTRS 2+, no worsening edema

## 2024-05-29 ENCOUNTER — APPOINTMENT (OUTPATIENT)
Dept: ANTEPARTUM | Facility: CLINIC | Age: 34
End: 2024-05-29

## 2024-06-05 ENCOUNTER — APPOINTMENT (OUTPATIENT)
Dept: ANTEPARTUM | Facility: CLINIC | Age: 34
End: 2024-06-05
